# Patient Record
Sex: FEMALE | Race: WHITE | NOT HISPANIC OR LATINO | URBAN - METROPOLITAN AREA
[De-identification: names, ages, dates, MRNs, and addresses within clinical notes are randomized per-mention and may not be internally consistent; named-entity substitution may affect disease eponyms.]

---
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
## 2024-01-22 ENCOUNTER — OFFICE VISIT (OUTPATIENT)
Dept: NEUROLOGY | Facility: CLINIC | Age: 19
End: 2024-01-22
Payer: COMMERCIAL

## 2024-01-22 DIAGNOSIS — G44.211 INTRACTABLE EPISODIC TENSION-TYPE HEADACHE: ICD-10-CM

## 2024-01-22 DIAGNOSIS — R51.9 NEW ONSET HEADACHE: Primary | ICD-10-CM

## 2024-01-22 PROBLEM — F32.A DEPRESSION: Status: ACTIVE | Noted: 2024-01-22

## 2024-01-22 PROBLEM — E88.810 METABOLIC SYNDROME: Status: ACTIVE | Noted: 2024-01-22

## 2024-01-22 PROCEDURE — 99203 OFFICE O/P NEW LOW 30 MIN: CPT | Mod: 95,,, | Performed by: STUDENT IN AN ORGANIZED HEALTH CARE EDUCATION/TRAINING PROGRAM

## 2024-01-22 RX ORDER — TOPIRAMATE 50 MG/1
50 TABLET, FILM COATED ORAL 2 TIMES DAILY
Qty: 60 TABLET | Refills: 11 | Status: SHIPPED | OUTPATIENT
Start: 2024-01-22 | End: 2025-01-21

## 2024-01-22 RX ORDER — BUTALBITAL, ACETAMINOPHEN AND CAFFEINE 50; 325; 40 MG/1; MG/1; MG/1
1 TABLET ORAL EVERY 6 HOURS PRN
Qty: 20 TABLET | Refills: 3 | Status: SHIPPED | OUTPATIENT
Start: 2024-01-22 | End: 2024-02-21

## 2024-01-22 NOTE — PROGRESS NOTES
Patient ID: [id number]  The patient location is: she is at home, [place]  The chief complaint leading to consultation is: headaches    Visit type: audiovisual    Face to Face time with patient: 27  41 minutes of total time spent on the encounter, which includes face to face time and non-face to face time preparing to see the patient (eg, review of tests), Obtaining and/or reviewing separately obtained history, Documenting clinical information in the electronic or other health record, Independently interpreting results (not separately reported) and communicating results to the patient/family/caregiver, or Care coordination (not separately reported).     Each patient to whom he or she provides medical services by telemedicine is:  (1) informed of the relationship between the physician and patient and the respective role of any other health care provider with respect to management of the patient; and (2) notified that he or she may decline to receive medical services by telemedicine and may withdraw from such care at any time.    Notes:      Subjective:     HPI:  Kendal Lara is a 18 y.o.  female with no significant PMHx except anxiety. she is presenting today as a new patient for evaluation of headaches.    Headache history  Age at onset: not sure  Course over time: increasing in frequency  Location: occipital radiating to retro-orbital  Character: dull, occasionally throbbing   Intensity: 6 on a scale from 1 to 10  Frequency: once every week before, more recently almost every day.   Duration: several hours  Timing: do not seem to be related to any time of the day   Mild/moderate/severe. Work attendance or other daily activities are affected by the headaches.  Aura: not preceded by an aura  Associated symptoms: No N/V and Photosensitivity   Associated neurologic symptoms:  none  Precipitating factors:   Aggravating factors: Standing up  Home treatment: Advil with some improvement. Imitrex with no improvement.    ER visits: No    She is in school at [employer]. She has been having daily headaches since the beginning of this semester.  Neurological exam by the campus' RN was reportedly normal.  She was prescribed sumatriptan, she has tried it 2-3 times which was not helpful  She is on Topamax and adipex for appetite and craving control.  Vision is more blurry without glasses than before. She didn't use to wear glasses other than when doing school work.    Review of Systems:  Review of Systems   HENT:  Negative for tinnitus.    Musculoskeletal:  Negative for falls.   Neurological:  Positive for headaches. Negative for dizziness, sensory change and weakness.   Psychiatric/Behavioral:  Negative for depression. The patient is nervous/anxious.    All other systems reviewed and are negative.     Past Medical History:  Active Ambulatory Problems     Diagnosis Date Noted    Metabolic syndrome 01/22/2024    Depression 01/22/2024     Resolved Ambulatory Problems     Diagnosis Date Noted    No Resolved Ambulatory Problems     No Additional Past Medical History     Allergies:  Review of patient's allergies indicates:   Allergen Reactions    Neomycin-bacitracin-polymyxin Rash     Pertinent Family History:  Family History   Problem Relation Age of Onset    Breast cancer Neg Hx     Colon cancer Neg Hx     Ovarian cancer Neg Hx    Younger brother has migraines     Pertinent Social History:  Social History     Tobacco Use    Smoking status: Unknown       Medications:  Current Outpatient Medications   Medication Instructions    butalbital-acetaminophen-caffeine -40 mg (FIORICET, ESGIC) -40 mg per tablet 1 tablet, Oral, Every 6 hours PRN, Not to take more than 2-3 days out of the week    EScitalopram oxalate (LEXAPRO) 20 mg, Oral    levonorgestrel (MIRENA IU)   0    phentermine (ADIPEX-P) 37.5 mg, Oral    spironolactone (ALDACTONE) 100 mg, Oral    topiramate (TOPAMAX) 50 mg, Oral, 2 times daily        Medication history:  AED  Neuromodulators  MAOIs  Ergot Alkaloids    Acetazolamide (Diamox) [] Phenelzine (Nardil) [] Dihydroergotamine (Migranal) []   Carbamazepine (Tegretol) [] Tranylcypromine (Parnate) [] Ergotamine (Ergomar) []   Gabapentin (Neurontin) [] Antihistamine/Serotonergic  Triptans    Lacosamide (Vimpat) [] Cyproheptadine (Periactin) [] Almotriptan (Axert) []   Lamotrigine (Lamictal) [] Antihypertensives  Eletriptan (Relpax) []   Levatiracetam (Keppra) [] Atenolol (Tenormin) [] Frovatriptan (Frova) []   Oxcarbazepine (Trileptal) [] Bisoprostol (Zebeta) [] Naratriptan (Amerge) []   Phenobarbital [] Candesartan (Atacand) [] Rizatriptan (Maxalt) []     Nebivolol (Bystolic)  Sumatriptan (Imitrex) [x]   Levetiracetam (Keppra)  Cardeilol (Coreg) [] Zolmitriptan (Zomig) []   Phenytoin (Dilantin) [] Diltiazem (Cardizem) []     Pregabalin (Lyrica) [] Lisinopril (Prinivil, Zestril) [] Combo Abortives    Primidone (Mysoline) [] Metoprolol (Toprol) [] BC Powder []   Tiagabine (Gabatril) [] Nadolol (Corgard) [] Butalbital and Acetaminophen (Bupap) []   Topiramate (Topamax)  (Trokendi) [] Nicardipine (Cardene) []     Vigabatrin (Sabril) [] Nimodipine (Nimotop) [] Butalbital, Acetaminophen, and caffeine (Fioricet) []   Valproic Acid (Depakote) (Divalproex Sodium) [] Propranolol (Inderal) []     Zonisamide (Zonegran) [] Telmisartan (Micardis) [] Butalbital, Aspirin, and caffeine (Fiorinal) []   Benzodiazepines  Timolol (Blocadren) []     Alprazolam (Xanax) [] Verapamil (Calan, Verelan) [] Butalbital, Caffeine, Acetaminophen, and Codeine (Fioricet with Codeine) []   Diazepam (Valium) [] NSAIDs      Lorazepam (Ativan) [] Acetaminophen (Tylenol) []     Clonazepam (Klonopin) [] Acetylsalicylic Acid (Aspirin) [] Butalbital, Caffeine, Aspirin, and Codeine  (Fiorinal with Codeine) []   Antidepressants  Diclofenac (Cambia) []     Amitriptyline (Elavil) [] Ibuprofen (Motrin) [x]     Desipramine (Norpramin) [] Indomethacin (Indocin) [] Aspirin,  Caffeine, and Acetaminophen (Excedrin) (Goodys) []   Doxepin (Sinequan) [] Ketoprofen (Orudis) []     Fluoxetine (Prozac) [] Ketorolac (Toradol) [] Acetaminophen, Dichloralphenazone, and Isometheptene (Midrin) []   Imipramine (Tofranil) [] Naproxen (Anaprox) (Aleve) []     Nortriptyline (Pamelor) [] Meclofenamic Acid (Meclomen) []     Venlafaxine (Effexor) [] Meloxicam (Mobic) [] Procedures    Desvenlafazine (Pristiq) [] Monoclonals  Greater occipital nerve block []   Duloxetine (Cymbalta) [] Eptinezumab [] Cervical, Thoracic, Lumbar radiofrequency ablation []   Trazadone [] Erenumab-aooe (Aimovig) [] Spenopalatine ganglion block []   Wellbutrin [] Galcanezumab (Emgality) [] Occipital neuro stimulation []   Protriptyline (Vivactil) [] Fremanazumab-vfrm (Ajovy)  Cervical, Thoracic, Lumbar, Caudal Epidural steroid injection []   Escitalopram (Lexapro) [] Other [] Sacroiliac joint steroid injection []   Celexa [] Memantine (Namenda) [] Transforaminal epidural steroid injection []   Oral CGRP inhibitors  Botox [] Facet joint injections []   Atogepant (Qulipta) [] Baclofen (Lioresal) [] Cervical, Thoracic, Lumbar medial branch blocks []   Rimegepant (Nurtec) []   Cefaly []   Ubrogepant (Ubrelvy) []   Gamma Core []    []   Iovera []    []   Transcranial Magnetic stimulation []       Objective:     *exam is limited due to the virtual nature of this visit    General:  Well-appearing, well-nourished, NAD, cooperative    Neurologic Exam:   Awake, alert and oriented x3  Speech spontaneous and fluent, intact comprehension.   Adequate fund of knowledge, vocabulary.  EOM intact. No ophthalmoplegia.   Facial expression is full and symmetric.   Hearing is intact.  Antigravity in BUE. No tremor.    Pertinent lab results  No recent relevant labs available to review     Pertinent imaging results  *No relevant imaging available to review     Other pertinent studies  None    Assessment:   Kendal Lara is a 18 y.o.  female with  anxiety who presents for evaluation of worsening headaches, currently occurring on a daily basis. Description of the headaches is more consistent with tension type headaches, perhaps with overlapping component of medication overuse headaches. Given the new onset and increasing intensity and frequency we will obtain imaging to rule to any intracranial pathologies. We will increase Topamax as a good preventative option for TTH. She will alternate Fioricet and ibuprofen for rescue, not to take either option more than twice a week. I'd recommend conservative management for milder headaches which could include hydration, rest, massage or ice pack. She will let me know how she will respond to increased dose of Topamax via the portal.    1. New onset headache    2. Intractable episodic tension-type headache      Plan:     - topiramate (TOPAMAX) 50 MG tablet; Take 1 tablet (50 mg total) by mouth 2 (two) times daily.  Dispense: 60 tablet; Refill: 11  - butalbital-acetaminophen-caffeine -40 mg (FIORICET, ESGIC) -40 mg per tablet; Take 1 tablet by mouth every 6 (six) hours as needed for Headaches. Not to take more than 2-3 days out of the week  Dispense: 20 tablet; Refill: 3  - MRI Brain W WO Contrast; Future      Time spent on this encounter: 41 minutes. This includes face to face time (obtaining history, documenting clinical information in the EMR, physical exam, discussing the plan with patient) and non-face to face time (such as preparing to see the patient (ie. Chart review, reviewing and interpreting previous labs and imaging), further EMR documentation, ordering tests, independently interpreting results and communicating results to the patient/family/caregiver, or care coordinator).     Disclaimer: This note was partly generated using dictation software which may occasionally result in transcription errors that are missed on review.      Tatum Calles MD    [facility]  01/22/2024

## 2024-01-23 ENCOUNTER — TELEPHONE (OUTPATIENT)
Dept: NEUROLOGY | Facility: CLINIC | Age: 19
End: 2024-01-23
Payer: COMMERCIAL

## 2024-01-23 NOTE — TELEPHONE ENCOUNTER
----- Message from Tatum Calles MD sent at 1/22/2024  3:56 PM CST -----  Will you please help her with scheduling her MRI? thanks